# Patient Record
Sex: MALE | Race: OTHER | Employment: FULL TIME | URBAN - METROPOLITAN AREA
[De-identification: names, ages, dates, MRNs, and addresses within clinical notes are randomized per-mention and may not be internally consistent; named-entity substitution may affect disease eponyms.]

---

## 2020-05-22 ENCOUNTER — PREP FOR PROCEDURE (OUTPATIENT)
Dept: UROLOGY | Age: 25
End: 2020-05-22

## 2020-05-22 RX ORDER — SODIUM CHLORIDE 9 MG/ML
INJECTION, SOLUTION INTRAVENOUS CONTINUOUS
Status: CANCELLED | OUTPATIENT
Start: 2020-05-22

## 2020-05-22 RX ORDER — SODIUM CHLORIDE 0.9 % (FLUSH) 0.9 %
10 SYRINGE (ML) INJECTION PRN
Status: CANCELLED | OUTPATIENT
Start: 2020-05-22

## 2020-05-22 RX ORDER — SODIUM CHLORIDE 0.9 % (FLUSH) 0.9 %
10 SYRINGE (ML) INJECTION EVERY 12 HOURS SCHEDULED
Status: CANCELLED | OUTPATIENT
Start: 2020-05-22

## 2020-05-23 ENCOUNTER — HOSPITAL ENCOUNTER (OUTPATIENT)
Age: 25
Discharge: HOME | End: 2020-05-25
Payer: COMMERCIAL

## 2020-05-23 PROCEDURE — U0003 INFECTIOUS AGENT DETECTION BY NUCLEIC ACID (DNA OR RNA); SEVERE ACUTE RESPIRATORY SYNDROME CORONAVIRUS 2 (SARS-COV-2) (CORONAVIRUS DISEASE [COVID-19]), AMPLIFIED PROBE TECHNIQUE, MAKING USE OF HIGH THROUGHPUT TECHNOLOGIES AS DESCRIBED BY CMS-2020-01-R: HCPCS

## 2020-05-24 LAB
SARS-COV-2: NOT DETECTED
SOURCE: NORMAL

## 2020-05-29 ENCOUNTER — HOSPITAL ENCOUNTER (OUTPATIENT)
Age: 25
Setting detail: OUTPATIENT SURGERY
Discharge: HOME | End: 2020-05-29
Attending: UROLOGY | Admitting: UROLOGY
Payer: COMMERCIAL

## 2020-05-29 ENCOUNTER — ANESTHESIA (OUTPATIENT)
Dept: OPERATING ROOM | Age: 25
End: 2020-05-29
Payer: COMMERCIAL

## 2020-05-29 ENCOUNTER — ANESTHESIA EVENT (OUTPATIENT)
Dept: OPERATING ROOM | Age: 25
End: 2020-05-29
Payer: COMMERCIAL

## 2020-05-29 VITALS
WEIGHT: 188 LBS | TEMPERATURE: 97 F | OXYGEN SATURATION: 96 % | HEART RATE: 70 BPM | HEIGHT: 68 IN | BODY MASS INDEX: 28.49 KG/M2 | RESPIRATION RATE: 20 BRPM | DIASTOLIC BLOOD PRESSURE: 68 MMHG | SYSTOLIC BLOOD PRESSURE: 114 MMHG

## 2020-05-29 VITALS — SYSTOLIC BLOOD PRESSURE: 94 MMHG | DIASTOLIC BLOOD PRESSURE: 49 MMHG | OXYGEN SATURATION: 94 %

## 2020-05-29 PROBLEM — N47.1 PHIMOSIS: Status: ACTIVE | Noted: 2020-05-29

## 2020-05-29 PROCEDURE — 2580000003 HC RX 258: Performed by: NURSE PRACTITIONER

## 2020-05-29 PROCEDURE — 6360000002 HC RX W HCPCS: Performed by: NURSE ANESTHETIST, CERTIFIED REGISTERED

## 2020-05-29 PROCEDURE — 3700000001 HC ADD 15 MINUTES (ANESTHESIA): Performed by: UROLOGY

## 2020-05-29 PROCEDURE — 7100000011 HC PHASE II RECOVERY - ADDTL 15 MIN: Performed by: UROLOGY

## 2020-05-29 PROCEDURE — 2500000003 HC RX 250 WO HCPCS: Performed by: UROLOGY

## 2020-05-29 PROCEDURE — 2500000003 HC RX 250 WO HCPCS: Performed by: NURSE ANESTHETIST, CERTIFIED REGISTERED

## 2020-05-29 PROCEDURE — 88304 TISSUE EXAM BY PATHOLOGIST: CPT

## 2020-05-29 PROCEDURE — 7100000010 HC PHASE II RECOVERY - FIRST 15 MIN: Performed by: UROLOGY

## 2020-05-29 PROCEDURE — 7100000000 HC PACU RECOVERY - FIRST 15 MIN: Performed by: UROLOGY

## 2020-05-29 PROCEDURE — 7100000001 HC PACU RECOVERY - ADDTL 15 MIN: Performed by: UROLOGY

## 2020-05-29 PROCEDURE — 2709999900 HC NON-CHARGEABLE SUPPLY: Performed by: UROLOGY

## 2020-05-29 PROCEDURE — 3600000012 HC SURGERY LEVEL 2 ADDTL 15MIN: Performed by: UROLOGY

## 2020-05-29 PROCEDURE — 3600000002 HC SURGERY LEVEL 2 BASE: Performed by: UROLOGY

## 2020-05-29 PROCEDURE — 6360000002 HC RX W HCPCS: Performed by: NURSE PRACTITIONER

## 2020-05-29 PROCEDURE — 3700000000 HC ANESTHESIA ATTENDED CARE: Performed by: UROLOGY

## 2020-05-29 RX ORDER — SODIUM CHLORIDE 0.9 % (FLUSH) 0.9 %
10 SYRINGE (ML) INJECTION EVERY 12 HOURS SCHEDULED
Status: DISCONTINUED | OUTPATIENT
Start: 2020-05-29 | End: 2020-05-29 | Stop reason: HOSPADM

## 2020-05-29 RX ORDER — 0.9 % SODIUM CHLORIDE 0.9 %
500 INTRAVENOUS SOLUTION INTRAVENOUS
Status: DISCONTINUED | OUTPATIENT
Start: 2020-05-29 | End: 2020-05-29 | Stop reason: HOSPADM

## 2020-05-29 RX ORDER — PROPOFOL 10 MG/ML
INJECTION, EMULSION INTRAVENOUS CONTINUOUS PRN
Status: DISCONTINUED | OUTPATIENT
Start: 2020-05-29 | End: 2020-05-29 | Stop reason: SDUPTHER

## 2020-05-29 RX ORDER — DEXAMETHASONE SODIUM PHOSPHATE 4 MG/ML
4 INJECTION, SOLUTION INTRA-ARTICULAR; INTRALESIONAL; INTRAMUSCULAR; INTRAVENOUS; SOFT TISSUE
Status: DISCONTINUED | OUTPATIENT
Start: 2020-05-29 | End: 2020-05-29 | Stop reason: HOSPADM

## 2020-05-29 RX ORDER — BUPIVACAINE HYDROCHLORIDE 5 MG/ML
INJECTION, SOLUTION EPIDURAL; INTRACAUDAL PRN
Status: DISCONTINUED | OUTPATIENT
Start: 2020-05-29 | End: 2020-05-29 | Stop reason: ALTCHOICE

## 2020-05-29 RX ORDER — SODIUM CHLORIDE 9 MG/ML
INJECTION, SOLUTION INTRAVENOUS CONTINUOUS
Status: DISCONTINUED | OUTPATIENT
Start: 2020-05-29 | End: 2020-05-29 | Stop reason: HOSPADM

## 2020-05-29 RX ORDER — ONDANSETRON 2 MG/ML
4 INJECTION INTRAMUSCULAR; INTRAVENOUS EVERY 6 HOURS PRN
Status: DISCONTINUED | OUTPATIENT
Start: 2020-05-29 | End: 2020-05-29 | Stop reason: HOSPADM

## 2020-05-29 RX ORDER — SODIUM CHLORIDE 0.9 % (FLUSH) 0.9 %
10 SYRINGE (ML) INJECTION PRN
Status: DISCONTINUED | OUTPATIENT
Start: 2020-05-29 | End: 2020-05-29 | Stop reason: HOSPADM

## 2020-05-29 RX ORDER — HYDROCODONE BITARTRATE AND ACETAMINOPHEN 5; 325 MG/1; MG/1
1 TABLET ORAL EVERY 4 HOURS PRN
Qty: 10 TABLET | Refills: 0 | Status: SHIPPED | OUTPATIENT
Start: 2020-05-29 | End: 2020-06-05

## 2020-05-29 RX ORDER — MORPHINE SULFATE 2 MG/ML
1 INJECTION, SOLUTION INTRAMUSCULAR; INTRAVENOUS EVERY 5 MIN PRN
Status: DISCONTINUED | OUTPATIENT
Start: 2020-05-29 | End: 2020-05-29 | Stop reason: HOSPADM

## 2020-05-29 RX ORDER — MORPHINE SULFATE 2 MG/ML
2 INJECTION, SOLUTION INTRAMUSCULAR; INTRAVENOUS EVERY 5 MIN PRN
Status: DISCONTINUED | OUTPATIENT
Start: 2020-05-29 | End: 2020-05-29 | Stop reason: HOSPADM

## 2020-05-29 RX ORDER — OXYCODONE HYDROCHLORIDE AND ACETAMINOPHEN 5; 325 MG/1; MG/1
1 TABLET ORAL EVERY 6 HOURS PRN
Status: DISCONTINUED | OUTPATIENT
Start: 2020-05-29 | End: 2020-05-29 | Stop reason: HOSPADM

## 2020-05-29 RX ORDER — FENTANYL CITRATE 50 UG/ML
INJECTION, SOLUTION INTRAMUSCULAR; INTRAVENOUS PRN
Status: DISCONTINUED | OUTPATIENT
Start: 2020-05-29 | End: 2020-05-29 | Stop reason: SDUPTHER

## 2020-05-29 RX ORDER — CEFAZOLIN SODIUM 2 G/50ML
2 SOLUTION INTRAVENOUS
Status: COMPLETED | OUTPATIENT
Start: 2020-05-29 | End: 2020-05-29

## 2020-05-29 RX ORDER — MIDAZOLAM HYDROCHLORIDE 1 MG/ML
INJECTION INTRAMUSCULAR; INTRAVENOUS PRN
Status: DISCONTINUED | OUTPATIENT
Start: 2020-05-29 | End: 2020-05-29 | Stop reason: SDUPTHER

## 2020-05-29 RX ORDER — LIDOCAINE HYDROCHLORIDE 20 MG/ML
INJECTION, SOLUTION EPIDURAL; INFILTRATION; INTRACAUDAL; PERINEURAL PRN
Status: DISCONTINUED | OUTPATIENT
Start: 2020-05-29 | End: 2020-05-29 | Stop reason: SDUPTHER

## 2020-05-29 RX ORDER — OXYCODONE HYDROCHLORIDE AND ACETAMINOPHEN 5; 325 MG/1; MG/1
1 TABLET ORAL
Status: DISCONTINUED | OUTPATIENT
Start: 2020-05-29 | End: 2020-05-29 | Stop reason: HOSPADM

## 2020-05-29 RX ORDER — ONDANSETRON 2 MG/ML
4 INJECTION INTRAMUSCULAR; INTRAVENOUS
Status: DISCONTINUED | OUTPATIENT
Start: 2020-05-29 | End: 2020-05-29 | Stop reason: HOSPADM

## 2020-05-29 RX ADMIN — FENTANYL CITRATE 50 MCG: 50 INJECTION, SOLUTION INTRAMUSCULAR; INTRAVENOUS at 11:49

## 2020-05-29 RX ADMIN — FENTANYL CITRATE 50 MCG: 50 INJECTION, SOLUTION INTRAMUSCULAR; INTRAVENOUS at 12:03

## 2020-05-29 RX ADMIN — MIDAZOLAM 2 MG: 1 INJECTION INTRAMUSCULAR; INTRAVENOUS at 11:46

## 2020-05-29 RX ADMIN — LIDOCAINE HYDROCHLORIDE 40 MG: 20 INJECTION, SOLUTION EPIDURAL; INFILTRATION; INTRACAUDAL; PERINEURAL at 11:49

## 2020-05-29 RX ADMIN — SODIUM CHLORIDE: 9 INJECTION, SOLUTION INTRAVENOUS at 12:24

## 2020-05-29 RX ADMIN — CEFAZOLIN SODIUM 2 G: 2 SOLUTION INTRAVENOUS at 11:46

## 2020-05-29 RX ADMIN — PROPOFOL 150 MCG/KG/MIN: 10 INJECTION, EMULSION INTRAVENOUS at 11:49

## 2020-05-29 RX ADMIN — SODIUM CHLORIDE: 9 INJECTION, SOLUTION INTRAVENOUS at 10:59

## 2020-05-29 ASSESSMENT — PAIN SCALES - GENERAL
PAINLEVEL_OUTOF10: 0

## 2020-05-29 ASSESSMENT — PAIN DESCRIPTION - PAIN TYPE
TYPE: SURGICAL PAIN

## 2020-05-29 ASSESSMENT — PULMONARY FUNCTION TESTS
PIF_VALUE: 1
PIF_VALUE: 0
PIF_VALUE: 1
PIF_VALUE: 0
PIF_VALUE: 1
PIF_VALUE: 0
PIF_VALUE: 1

## 2020-05-29 ASSESSMENT — PAIN DESCRIPTION - LOCATION
LOCATION: PENIS

## 2020-05-29 ASSESSMENT — PAIN - FUNCTIONAL ASSESSMENT: PAIN_FUNCTIONAL_ASSESSMENT: 0-10

## 2020-05-29 NOTE — H&P
5/29/2020 11:39 AM  Service: Urology  Group: [facility] ([facility]/Geetha/Juan)    Tayler Gil  [id number]     Chief Complaint: Phimosis    History of Present Illness: The patient is a 30 y.o. male patient who presents with phimosis. Patient also has had some occasional dysuria but no redness no discharge. He has not had known urinary tract infection. No gross hematuria no frequency urgency no incomplete emptying sensation no known urinary tract infections. I talked to him in the office on 3/24/2020 about elective circumcision he seemed understand indications risks and alternatives and was agreeable to the present procedure to be done with monitored anesthesia and local  He has no nocturia no flank pain no intermittency. No gastr intestinal related problems    Past Medical History:   Diagnosis Date    Phimosis 05/2020       History reviewed. No pertinent surgical history. Medications Prior to Admission:    No medications prior to admission. Allergies:    Patient has no known allergies. Social History:    reports that he has never smoked. He has never used smokeless tobacco. He reports current alcohol use. He reports that he does not use drugs. Family History:   Non-contributory to this urological problem  family history is not on file. He lives at home with his brother and his [de-identified].   Occupation he does factory work    Review of Systems:  Respiratory: negative for cough and hemoptysis  Cardiovascular: negative for chest pain and dyspnea  Gastrointestinal: negative for abdominal pain, diarrhea, nausea and vomiting  Derm: negative for rash and skin lesion(s)  Neurological: negative for seizures and tremors  Endocrine: negative for diabetic symptoms including polydipsia and polyuria  : As above in the HPI, otherwise negative  All other reviews are negative    Physical Exam:   Vitals: /76   Pulse 67   Temp 96.6 °F (35.9 °C) (Temporal)   Resp 14   Ht 5' 8\" (1.727 m)   Wt 188 lb (85.3 kg)

## 2020-05-29 NOTE — INTERVAL H&P NOTE
Update History & Physical    The patient's History and Physical of today was reviewed with the patient and I examined the patient. There was no change  In the plan The surgical site was confirmed by the patient and me. Plan: The risks, benefits, expected outcome, and alternative to the recommended procedure have been discussed with the patient. Patient understands and wants to proceed with the procedure.      Electronically signed by Franklin Cotto MD on 5/29/2020 at 11:44 AM

## 2020-05-29 NOTE — ANESTHESIA PRE PROCEDURE
Department of Anesthesiology  Preprocedure Note       Name:  Nury Capellan   Age:  30 y.o.  :  1995                                          MRN:  [id number]         Date:  2020      Surgeon: Alea Kim):  Ariel Pavon MD    Procedure: Procedure(s):  CIRCUMCISION    Medications prior to admission:   Prior to Admission medications    Medication Sig Start Date End Date Taking? Authorizing Provider   HYDROcodone-acetaminophen (NORCO) 5-325 MG per tablet Take 1 tablet by mouth every 4 hours as needed for Pain for up to 10 doses. 20 Yes Mendoza Pavon MD       Current medications:    Current Facility-Administered Medications   Medication Dose Route Frequency Provider Last Rate Last Dose    0.9 % sodium chloride infusion   Intravenous Continuous LINN Agosto CNP        ceFAZolin (ANCEF) 2 g in dextrose 3 % 50 mL IVPB (duplex)  2 g Intravenous On Call to [address], APRN - CNP        sodium chloride flush 0.9 % injection 10 mL  10 mL Intravenous 2 times per day LINN Agosto CNP        sodium chloride flush 0.9 % injection 10 mL  10 mL Intravenous PRN LINN Agosto CNP           Allergies:  No Known Allergies    Problem List:  There is no problem list on file for this patient. Past Medical History:        Diagnosis Date    Phimosis 2020       Past Surgical History:  History reviewed. No pertinent surgical history.     Social History:    Social History     Tobacco Use    Smoking status: Never Smoker    Smokeless tobacco: Never Used   Substance Use Topics    Alcohol use: Yes     Comment: socially                                Counseling given: Not Answered      Vital Signs (Current):   Vitals:    20 1200 20 0938   BP:  129/76   Pulse:  67   Resp:  14   Temp:  96.6 °F (35.9 °C)   TempSrc:  Temporal   SpO2:  98%   Weight: 188 lb (85.3 kg) 188 lb (85.3 kg)   Height: 5' 8\" (1.727 m) 5' 8\" (1.727 m)

## 2020-05-29 NOTE — PROGRESS NOTES
CLINICAL PHARMACY NOTE: MEDS TO [address] Select Patient?: No  Total # of Prescriptions Filled: 1   The following medications were delivered to the patient:  · Hydrocodone 5-325 mg  Total # of Interventions Completed: 7  Time Spent (min): 60    Additional Documentation:

## 2020-05-29 NOTE — LETTER
[facility]  [address] [facility] [address]  Phone: [phone]  [facility]  [phone]        May 29, 2020     Patient: Nahum Walker   YOB: 1995   Date of Visit: 5/1/2020       To Whom it May Concern:    Gearline Denver was seen for surgery on 5/1/2020. He may return to work on 06/05/2020. If you have any questions or concerns, please don't hesitate to call.     Sincerely,

## 2020-05-30 NOTE — OP NOTE
sutured to the coronal skin with 3-0 chromic  sutures. At the end of the case, there was a good ventral frenulum to  prevent chordee. The sponge, needle, and instrument counts were  correct. Blood loss was less than 5 mL. The patient tolerated the  procedure well and was sent to recovery in satisfactory condition.         Emery Moser MD    D: 05/29/2020 20:00:15       T: 05/29/2020 23:44:11     [id number]  Job#: [id number]     Doc#: [id number]    CC:  Nacho Delgado MD

## (undated) DEVICE — TUBING SUCT 12FR MAL ALUM SHFT FN CAP VENT UNIV CONN W/ OBT

## (undated) DEVICE — GOWN,SIRUS,FABRNF,2XL,18/CS: Brand: MEDLINE

## (undated) DEVICE — SHEET, T, LAPAROTOMY, STERILE: Brand: MEDLINE

## (undated) DEVICE — NEEDLE HYPO 25GA L1.5IN BLU POLYPR HUB S STL REG BVL STR

## (undated) DEVICE — 4-PORT MANIFOLD: Brand: NEPTUNE 2

## (undated) DEVICE — WRAP COHESIVE W2INXL5YD TAN SELF ADH BNDG HND NON STERILE TEAR CARING

## (undated) DEVICE — ELECTRODE ELECSURG NDL 2.8 INX7.2 CM COAT INSUL EDGE

## (undated) DEVICE — DOUBLE BASIN SET: Brand: MEDLINE INDUSTRIES, INC.

## (undated) DEVICE — ELECTRODE PT RET AD L9FT HI MOIST COND ADH HYDRGEL CORDED

## (undated) DEVICE — SOLUTION IRRIG 1000ML 09% SOD CHL USP PIC PLAS CONTAINER

## (undated) DEVICE — READY WET SKIN SCRUB TRAY-LF: Brand: MEDLINE INDUSTRIES, INC.

## (undated) DEVICE — INTENDED FOR TISSUE SEPARATION, AND OTHER PROCEDURES THAT REQUIRE A SHARP SURGICAL BLADE TO PUNCTURE OR CUT.: Brand: BARD-PARKER ® STAINLESS STEEL BLADES

## (undated) DEVICE — SYRINGE MED 10ML TRNSLUC BRL PLUNG BLK MRK POLYPR CTRL

## (undated) DEVICE — GAUZE,SPONGE,4"X4",8PLY,STRL,LF,10/TRAY: Brand: MEDLINE

## (undated) DEVICE — PACK PROCEDURE SURG GEN CUST

## (undated) DEVICE — TOWEL,OR,DSP,ST,BLUE,STD,6/PK,12PK/CS: Brand: MEDLINE

## (undated) DEVICE — BLADE CLIPPER SURG SENSICLIP

## (undated) DEVICE — UNDERPANTS INCONT XL 45-70IN KNIT SEAMLESS DSGN COLOR-CODED